# Patient Record
(demographics unavailable — no encounter records)

---

## 2024-10-16 NOTE — HISTORY OF PRESENT ILLNESS
[FreeTextEntry1] : 80-year-old female with history of left lower extremity venous insufficiency status post endovenous laser treatment at an outside institution of her left small saphenous vein a few months ago. Patient now states she has worsening pain and numbness within the left calf that extends into her lateral aspect of her foot and ankle. She is complaining of a tender swelling within the posterior subcutaneous tissue of the upper calf. Patient denies any shortness breath, chest pain, nausea vomiting. She denies any fever or chills.

## 2024-10-16 NOTE — ASSESSMENT
[FreeTextEntry1] : 80-year-old female with history of left lower extremity venous insufficiency status post left lower extremity small saphenous vein endovenous laser treatment at an outside institution. She states that since her procedure a few months ago she has developed left lateral foot and ankle pain and numbness. She has a tender palpable nodule located within the posterior upper calf in which she states the outside physician had removed a foreign object from. Therefore, I will order a x-ray of the left knee to evaluate for any residual foreign material that may be resulting in her symptoms. Will follow up once imaging is completed.

## 2024-10-16 NOTE — PHYSICAL EXAM
[Alert] : alert [No Acute Distress] : no acute distress [No Respiratory Distress] : no respiratory distress [Normal Rate and Effort] : normal respiratory rhythm and effort [de-identified] : Bilateral lower extremity, no swelling, palpable nodule located within the upper portion of the left calf posterior, tender to palpation. No evidence of cellulitis or hematoma. No significant swelling. Palpable pedal pulses. No active ulcerations.

## 2024-10-30 NOTE — HISTORY OF PRESENT ILLNESS
[FreeTextEntry1] : 80-year-old female with history of left lower extremity venous insufficiency status post endovenous laser treatment at an outside institution of her left small saphenous vein a few months ago. Patient now states she has worsening pain and numbness within the left calf that extends into her lateral aspect of her foot and ankle. She is complaining neck and shoulder pain bilateral and left posterior thigh pain at rest.

## 2024-10-30 NOTE — ASSESSMENT
[FreeTextEntry1] : 80-year-old female with history of left lower extremity venous insufficiency status post left lower extremity small saphenous vein endovenous laser treatment at an outside institution.  She states that since her procedure in May 2024 she has developed left lateral foot and ankle pain and pain in the calf radiating to the posterior ankle and numbness, as well as left posterior thigh pain and now she also complains of total body aches. She has trouble walking and crossing her legs that causes calf pain. A recent CT scan revealed sub -cm foreign body, likely metallic within the occluded small saphenous vein. I do not believe her symptoms are related to the foreign material and most likely due to nerve irritation secondary to small saphenous vein ablation.  I explained that removal of the object can cause significant swelling and scarring and likely will not cure her symptoms I performed a duplex in the office that showed occluded small saphenous and could not visualize any foreign material.  Her symptoms may also be related to lower back disc disease  I will refer her to pain management.   40 minutes spent with the patient obtaining history, reviewing labs/studies ordering studies, physical exam and discussing plan.   Thank you for allowing me to participate in the care of your patient.   Sincerely,  Gabo Truong MD, RPVI, FACS Associate Professor of Surgery , Vascular Fellowship Director of Limb Salvage Surgery [facility] [employer] [facility] [place]

## 2024-10-30 NOTE — PHYSICAL EXAM
[Alert] : alert [No Acute Distress] : no acute distress [No Respiratory Distress] : no respiratory distress [Normal Rate and Effort] : normal respiratory rhythm and effort [de-identified] : Bilateral lower extremity, no swelling, palpable nodule located within the upper portion of the left calf posterior, tender to palpation. No evidence of cellulitis or hematoma. No significant swelling. Palpable pedal pulses. No active ulcerations.